# Patient Record
(demographics unavailable — no encounter records)

---

## 2024-10-25 NOTE — PHYSICAL EXAM
[Murmur] : murmur [Normal] : moves all extremities, no focal deficits, normal speech [de-identified] : +3 systolic murmur at the apex

## 2024-10-25 NOTE — REVIEW OF SYSTEMS
[Vertigo] : vertigo [Dizziness] : dizziness [Negative] : Gastrointestinal [Hearing Loss] : no hearing loss [Anxiety] : no anxiety [FreeTextEntry5] : see HPi

## 2024-10-25 NOTE — HISTORY OF PRESENT ILLNESS
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  Echo in November 21 showed Moderate eccentric MR. Seen by CTS who recommended no intervention at that point.   Patient presents for a follow up visit. Denies chest pain, SOB, or palpitations. BP at home <120/80-130s/80s  TTE 7/2024: EF 63%, mild LAE, Aortic root 4, mod eccentric intravalvular regurgitation.   GFR 84.

## 2024-10-25 NOTE — ASSESSMENT
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 Moderate eccentric MR.  HTN. Elevated BP in the offfice but consistently normal at home. BP machine checked. HLD. Stable lung nodule.  Plan:  Low-salt, ow-fat diet discussed with patient. Continue Cardizem CD (in the evening). Continue Losartan (in the morning). F/u in 6 months with repeat BW.  Shahid Villalpando MD

## 2024-10-25 NOTE — CARDIOLOGY SUMMARY
[de-identified] : 10/25/24: SR 93/min, no ST changes. [de-identified] : 11/26/22 PACs <1 % PVCs < 1 %  [de-identified] : 11/18/21 3D LVEF 55% Mitral valve repair with moderate eccentric anteriorly directed MR jet.  [de-identified] : Cath 3/25/21 Normal coronaries  [de-identified] : mitral valve repair with 34 mm physio II ring on September 28 2021

## 2025-02-04 NOTE — PHYSICAL EXAM
[Alert] : alert [Normal Voice/Communication] : normal voice/communication [No Acute Distress] : no acute distress [Well Developed] : well developed [Well Nourished] : well nourished [Sclera] : the sclera and conjunctiva were normal [Hearing Threshold Finger Rub Not [place]] : hearing was normal [Normal Appearance] : the appearance of the neck was normal [No Respiratory Distress] : no respiratory distress [No Acc Muscle Use] : no accessory muscle use [Respiration, Rhythm And Depth] : normal respiratory rhythm and effort [Auscultation Breath Sounds / Voice Sounds] : lungs were clear to auscultation bilaterally

## 2025-02-04 NOTE — PHYSICAL EXAM
[Alert] : alert [Normal Voice/Communication] : normal voice/communication [No Acute Distress] : no acute distress [Well Developed] : well developed [Well Nourished] : well nourished [Sclera] : the sclera and conjunctiva were normal [Hearing Threshold Finger Rub Not [place]] : hearing was normal [Normal Appearance] : the appearance of the neck was normal [No Respiratory Distress] : no respiratory distress [No Acc Muscle Use] : no accessory muscle use [Respiration, Rhythm And Depth] : normal respiratory rhythm and effort [Auscultation Breath Sounds / Voice Sounds] : lungs were clear to auscultation bilaterally

## 2025-02-04 NOTE — HISTORY OF PRESENT ILLNESS
[FreeTextEntry1] : 46 yr old male at average risk for CRC, HTN, HLD, S/P mitral valve repair in 2021, Ex-smoker, was referred here by his PCP for further evaluation of  diarrhea and rectal bleeding.  He C/O intermittent diarrhea since 5/24 that sometimes lasts a week at a time, up to 4 BMs a day. He described the stool as watery. He also C/O rectal bleeding intermittently for more than a year, sometimes in the toilet and usually occurs after  increased BMs. He sometimes gets lower abdominal cramping prior to and after the diarrhea. No weight loss. He has a H/O nausea with occasional vomiting since started on medications after MVP in 2021. He takes famotidine 20 mg bid for dyspepsia. He gets occasional dysphagia about once a month.   He never had an EGD or colonoscopy.  Labs 8/24: CBC H/H 13.0/37.7 CMP glucose 101, otherwise normal  TSH normal  A1C  5.5

## 2025-02-04 NOTE — ASSESSMENT
[FreeTextEntry1] : 46 yr old male at average risk for CRC, HTN, HLD, S/P mitral valve repair in 2021, Ex-smoker, was referred here by his PCP for further evaluation of  diarrhea and rectal bleeding.  He C/O intermittent diarrhea since 5/24 that sometimes lasts a week at a time, up to 4 BMs a day. He described the stool as watery. He also C/O rectal bleeding intermittently for more than a year, sometimes in the toilet and usually occurs after  increased BMs. He sometimes gets lower abdominal cramping prior to and after the diarrhea. No weight loss. He has a H/O nausea with occasional vomiting since started on medications after MVP in 2021. He takes famotidine 20 mg bid for dyspepsia. He gets occasional dysphagia about once a month.   He never had an EGD or colonoscopy.  Labs 8/24: CBC H/H 13.0/37.7 CMP glucose 101, otherwise normal  TSH normal  A1C  5.5  Diarrhea Rectal bleeding - Will schedule a colonoscopy with random biopsies; risks and benefits discussed - Will do diarrhea panel   Dyspepsia with occasional nausea Dysphagia, not frequent - Will do an EGD with eso bx; risks and benefits discussed - Continue pepcid 20 mg bid (has refills)

## 2025-04-25 NOTE — ASSESSMENT
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 Moderate eccentric MR.  HTN. Elevated BP in the offfice but consistently normal at home. BP machine checked. HLD. Stable lung nodule.  Plan:  TTE for eval of MR ( mod intralvular reg 7/2024)  Low-salt, ow-fat diet discussed with patient. Continue Cardizem CD (in the evening). Continue Losartan (in the morning). F/u in 6 months with repeat BW.  Shahid Villalpando MD

## 2025-04-25 NOTE — HISTORY OF PRESENT ILLNESS
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  Echo in November 21 showed Moderate eccentric MR. Seen by CTS who recommended no intervention at that point.   Patient presents for a follow up visit. reports has tracked bP at home 100-120's/ 70's. Reports he is always elevated at dr office. He has no shortness of breath chest pain or pressure. walks at work averages 20K steps a day no symptoms.   TTE 7/2024: EF 63%, mild LAE, Aortic root 4, mod eccentric intravalvular regurgitation.  Labs  HDL 85 LDL 74  A1c 5.3 pro .

## 2025-04-25 NOTE — CARDIOLOGY SUMMARY
[de-identified] : 4/25/25: SR 93/min, no ST changes. [de-identified] : 11/26/22 PACs <1 % PVCs < 1 %  [de-identified] : 11/18/21 3D LVEF 55% Mitral valve repair with moderate eccentric anteriorly directed MR jet.  [de-identified] : Cath 3/25/21 Normal coronaries  [de-identified] : mitral valve repair with 34 mm physio II ring on September 28 2021

## 2025-04-25 NOTE — PHYSICAL EXAM
[Murmur] : murmur [Normal] : moves all extremities, no focal deficits, normal speech [de-identified] : +3 systolic murmur at the apex